# Patient Record
Sex: MALE | Race: OTHER | HISPANIC OR LATINO | URBAN - METROPOLITAN AREA
[De-identification: names, ages, dates, MRNs, and addresses within clinical notes are randomized per-mention and may not be internally consistent; named-entity substitution may affect disease eponyms.]

---

## 2020-07-02 ENCOUNTER — OFFICE VISIT (OUTPATIENT)
Dept: URBAN - METROPOLITAN AREA SURGERY CENTER 4 | Facility: SURGERY CENTER | Age: 50
End: 2020-07-02

## 2020-07-07 LAB — PATHOLOGY (INDENTED REPORT): (no result)

## 2020-07-13 ENCOUNTER — OFFICE VISIT (OUTPATIENT)
Dept: URBAN - METROPOLITAN AREA CLINIC 63 | Facility: CLINIC | Age: 50
End: 2020-07-13

## 2020-08-05 ENCOUNTER — OFFICE VISIT (OUTPATIENT)
Dept: URBAN - METROPOLITAN AREA CLINIC 63 | Facility: CLINIC | Age: 50
End: 2020-08-05

## 2020-10-01 ENCOUNTER — OFFICE VISIT (OUTPATIENT)
Dept: URBAN - METROPOLITAN AREA TELEHEALTH 2 | Facility: TELEHEALTH | Age: 50
End: 2020-10-01

## 2020-10-08 ENCOUNTER — OFFICE VISIT (OUTPATIENT)
Dept: URBAN - METROPOLITAN AREA TELEHEALTH 2 | Facility: TELEHEALTH | Age: 50
End: 2020-10-08

## 2020-12-18 ENCOUNTER — OFFICE VISIT (OUTPATIENT)
Dept: URBAN - METROPOLITAN AREA CLINIC 63 | Facility: CLINIC | Age: 50
End: 2020-12-18

## 2020-12-30 ENCOUNTER — OFFICE VISIT (OUTPATIENT)
Dept: URBAN - METROPOLITAN AREA CLINIC 63 | Facility: CLINIC | Age: 50
End: 2020-12-30

## 2021-03-26 ENCOUNTER — OFFICE VISIT (OUTPATIENT)
Dept: URBAN - METROPOLITAN AREA CLINIC 63 | Facility: CLINIC | Age: 51
End: 2021-03-26

## 2021-04-07 ENCOUNTER — OFFICE VISIT (OUTPATIENT)
Dept: URBAN - METROPOLITAN AREA CLINIC 60 | Facility: CLINIC | Age: 51
End: 2021-04-07

## 2021-04-21 ENCOUNTER — OFFICE VISIT (OUTPATIENT)
Dept: URBAN - METROPOLITAN AREA CLINIC 60 | Facility: CLINIC | Age: 51
End: 2021-04-21

## 2022-03-03 ENCOUNTER — APPOINTMENT (RX ONLY)
Dept: URBAN - METROPOLITAN AREA CLINIC 335 | Facility: CLINIC | Age: 52
Setting detail: DERMATOLOGY
End: 2022-03-03

## 2022-03-03 DIAGNOSIS — D18.0 HEMANGIOMA: ICD-10-CM

## 2022-03-03 DIAGNOSIS — L81.4 OTHER MELANIN HYPERPIGMENTATION: ICD-10-CM

## 2022-03-03 DIAGNOSIS — L24 IRRITANT CONTACT DERMATITIS: ICD-10-CM

## 2022-03-03 DIAGNOSIS — L60.8 OTHER NAIL DISORDERS: ICD-10-CM

## 2022-03-03 DIAGNOSIS — L82.1 OTHER SEBORRHEIC KERATOSIS: ICD-10-CM

## 2022-03-03 DIAGNOSIS — D22 MELANOCYTIC NEVI: ICD-10-CM

## 2022-03-03 DIAGNOSIS — B35.2 TINEA MANUUM: ICD-10-CM

## 2022-03-03 PROBLEM — D18.01 HEMANGIOMA OF SKIN AND SUBCUTANEOUS TISSUE: Status: ACTIVE | Noted: 2022-03-03

## 2022-03-03 PROBLEM — L24.9 IRRITANT CONTACT DERMATITIS, UNSPECIFIED CAUSE: Status: ACTIVE | Noted: 2022-03-03

## 2022-03-03 PROBLEM — D22.5 MELANOCYTIC NEVI OF TRUNK: Status: ACTIVE | Noted: 2022-03-03

## 2022-03-03 PROCEDURE — ? PRESCRIPTION

## 2022-03-03 PROCEDURE — ? REFERRAL

## 2022-03-03 PROCEDURE — ? COUNSELING

## 2022-03-03 PROCEDURE — 99213 OFFICE O/P EST LOW 20 MIN: CPT

## 2022-03-03 PROCEDURE — ? FULL BODY SKIN EXAM

## 2022-03-03 PROCEDURE — ? TREATMENT REGIMEN

## 2022-03-03 PROCEDURE — ? ADDITIONAL NOTES

## 2022-03-03 RX ORDER — ECONAZOLE NITRATE 10 MG/G
CREAM TOPICAL BID
Qty: 85 | Refills: 3 | Status: ERX | COMMUNITY
Start: 2022-03-03

## 2022-03-03 RX ORDER — CICLOPIROX 80 MG/ML
SOLUTION TOPICAL
Qty: 6.6 | Refills: 6 | Status: ERX | COMMUNITY
Start: 2022-03-03

## 2022-03-03 RX ADMIN — ECONAZOLE NITRATE: 10 CREAM TOPICAL at 00:00

## 2022-03-03 RX ADMIN — CICLOPIROX: 80 SOLUTION TOPICAL at 00:00

## 2022-03-03 ASSESSMENT — LOCATION ZONE DERM
LOCATION ZONE: TRUNK
LOCATION ZONE: ARM
LOCATION ZONE: HAND
LOCATION ZONE: FINGERNAIL
LOCATION ZONE: LIP
LOCATION ZONE: HAND

## 2022-03-03 ASSESSMENT — LOCATION DETAILED DESCRIPTION DERM
LOCATION DETAILED: LEFT MIDDLE FINGERNAIL
LOCATION DETAILED: RIGHT RADIAL DORSAL HAND
LOCATION DETAILED: RIGHT RADIAL DORSAL HAND
LOCATION DETAILED: RIGHT INDEX FINGERNAIL
LOCATION DETAILED: RIGHT SUPERIOR MEDIAL MIDBACK
LOCATION DETAILED: LEFT SUPERIOR VERMILION LIP
LOCATION DETAILED: LEFT MID-UPPER BACK
LOCATION DETAILED: LEFT ANTERIOR SHOULDER
LOCATION DETAILED: EPIGASTRIC SKIN
LOCATION DETAILED: LEFT RADIAL DORSAL HAND
LOCATION DETAILED: LEFT INDEX FINGERNAIL
LOCATION DETAILED: LEFT RADIAL DORSAL HAND

## 2022-03-03 ASSESSMENT — LOCATION SIMPLE DESCRIPTION DERM
LOCATION SIMPLE: RIGHT INDEX FINGER
LOCATION SIMPLE: LEFT MIDDLE FINGER
LOCATION SIMPLE: LEFT LIP
LOCATION SIMPLE: RIGHT HAND
LOCATION SIMPLE: LEFT INDEX FINGER
LOCATION SIMPLE: LEFT SHOULDER
LOCATION SIMPLE: RIGHT LOWER BACK
LOCATION SIMPLE: ABDOMEN
LOCATION SIMPLE: RIGHT HAND
LOCATION SIMPLE: LEFT HAND
LOCATION SIMPLE: LEFT UPPER BACK
LOCATION SIMPLE: LEFT HAND

## 2022-07-09 ENCOUNTER — TELEPHONE ENCOUNTER (OUTPATIENT)
Dept: URBAN - METROPOLITAN AREA CLINIC 121 | Facility: CLINIC | Age: 52
End: 2022-07-09

## 2022-07-09 RX ORDER — FAMOTIDINE 10 MG
ONCE A DAY TABLET ORAL ONCE A DAY
Refills: 3 | OUTPATIENT
Start: 2019-09-13 | End: 2019-12-04

## 2022-07-09 RX ORDER — FAMOTIDINE 10 MG
ONCE A DAY TABLET ORAL ONCE A DAY
Refills: 3 | OUTPATIENT
Start: 2019-06-13 | End: 2019-09-13

## 2022-07-09 RX ORDER — OMEG3/DHA/EPA/FISH OIL/L.CASEI 120-400MG
ONCE A DAY CAPSULE ORAL ONCE A DAY
Refills: 0 | OUTPATIENT
Start: 2019-05-15 | End: 2019-06-13

## 2022-07-09 RX ORDER — CANDESARTAN CILEXETIL 32 MG/1
TABLET ORAL
Refills: 0 | OUTPATIENT
Start: 2020-02-05 | End: 2020-04-07

## 2022-07-09 RX ORDER — LEVOTHYROXINE SODIUM 0.14 MG/1
TABLET ORAL
Refills: 0 | OUTPATIENT
Start: 2020-01-15 | End: 2020-04-07

## 2022-07-09 RX ORDER — HYDROXYZINE HYDROCHLORIDE 50 MG/1
TABLET, FILM COATED ORAL TWICE A DAY
Refills: 0 | OUTPATIENT
Start: 2020-03-17 | End: 2020-04-07

## 2022-07-09 RX ORDER — SUCRALFATE 1 G/1
TABLET ORAL
Refills: 0 | OUTPATIENT
Start: 2020-04-07 | End: 2020-04-15

## 2022-07-09 RX ORDER — OMEPRAZOLE 40 MG/1
CAPSULE, DELAYED RELEASE ORAL
Refills: 0 | OUTPATIENT
Start: 2020-04-07 | End: 2020-04-15

## 2022-07-09 RX ORDER — OLMESARTAN MEDOXOMIL 40 MG/1
TABLET, FILM COATED ORAL
Refills: 0 | OUTPATIENT
Start: 2019-03-05 | End: 2019-09-13

## 2022-07-09 RX ORDER — HYDROCORTISONE 2.5% 25 MG/G
CREAM TOPICAL TWICE A DAY
Refills: 0 | OUTPATIENT
Start: 2019-06-13 | End: 2019-09-13

## 2022-07-09 RX ORDER — AMLODIPINE BESYLATE 10 MG/1
TABLET ORAL
Refills: 0 | OUTPATIENT
Start: 2020-01-30 | End: 2020-04-07

## 2022-07-09 RX ORDER — BUSPIRONE HYDROCHLORIDE 5 MG/1
TABLET ORAL
Refills: 0 | OUTPATIENT
Start: 2020-01-30 | End: 2020-04-07

## 2022-07-09 RX ORDER — HYDROCORTISONE 2.5% 25 MG/G
CREAM TOPICAL TWICE A DAY
Refills: 0 | OUTPATIENT
Start: 2018-06-12 | End: 2019-06-13

## 2022-07-09 RX ORDER — FENOFIBRATE 145 MG/1
TABLET, FILM COATED ORAL
Refills: 0 | OUTPATIENT
Start: 2019-04-24 | End: 2019-09-13

## 2022-07-09 RX ORDER — LIDOCAINE HCL AND HYDROCORTISONE ACETATE 30; 5 MG/G; MG/G
TWICE A DAY CREAM RECTAL; TOPICAL TWICE A DAY
Refills: 3 | OUTPATIENT
Start: 2018-05-03 | End: 2018-06-12

## 2022-07-09 RX ORDER — LIDOCAINE 50 MG/G
TWICE A DAY CREAM TOPICAL TWICE A DAY
Refills: 1 | OUTPATIENT
Start: 2020-12-30 | End: 2020-12-30

## 2022-07-09 RX ORDER — AMLODIPINE BESYLATE 10 MG/1
TABLET ORAL ONCE A DAY
Refills: 0 | OUTPATIENT
Start: 2019-05-07 | End: 2019-06-13

## 2022-07-09 RX ORDER — RISPERIDONE 2 MG/1
TABLET ORAL
Refills: 0 | OUTPATIENT
Start: 2020-02-19 | End: 2020-04-07

## 2022-07-09 RX ORDER — HYDROCORTISONE 2.5% 25 MG/G
THREE TIMES A DAY CREAM TOPICAL THREE TIMES A DAY
Refills: 0 | OUTPATIENT
Start: 2018-05-24 | End: 2018-06-12

## 2022-07-09 RX ORDER — SUCRALFATE 1 G/1
TABLET ORAL
Refills: 0 | OUTPATIENT
Start: 2020-03-25 | End: 2020-04-07

## 2022-07-09 RX ORDER — OMEPRAZOLE 20 MG/1
TWICE A DAY CAPSULE, DELAYED RELEASE ORAL TWICE A DAY
Refills: 3 | OUTPATIENT
Start: 2019-06-13 | End: 2019-09-13

## 2022-07-09 RX ORDER — LIDOCAINE HCL AND HYDROCORTISONE ACETATE 30; 25 MG/G; MG/G
TWICE A DAY GEL RECTAL TWICE A DAY
Refills: 1 | OUTPATIENT
Start: 2018-05-30 | End: 2018-06-12

## 2022-07-09 RX ORDER — LIDOCAINE 50 MG/G
TWICE A DAY CREAM TOPICAL TWICE A DAY
Refills: 3 | OUTPATIENT
Start: 2019-06-13 | End: 2019-09-13

## 2022-07-09 RX ORDER — OMEPRAZOLE 40 MG/1
CAPSULE, DELAYED RELEASE ORAL
Refills: 0 | OUTPATIENT
Start: 2020-01-30 | End: 2020-04-07

## 2022-07-09 RX ORDER — SUCRALFATE 1 G/1
TWICE A DAY TABLET ORAL TWICE A DAY
Refills: 3 | OUTPATIENT
Start: 2019-06-13 | End: 2019-09-13

## 2022-07-09 RX ORDER — SERTRALINE 50 MG/1
TABLET, FILM COATED ORAL
Refills: 0 | OUTPATIENT
Start: 2020-03-17 | End: 2020-04-07

## 2022-07-09 RX ORDER — AMLODIPINE BESYLATE 10 MG/1
TABLET ORAL ONCE A DAY
Refills: 0 | OUTPATIENT
Start: 2019-06-13 | End: 2019-06-13

## 2022-07-09 RX ORDER — HYDROCORTISONE 2.5% 25 MG/G
CREAM TOPICAL
Refills: 0 | OUTPATIENT
Start: 2018-06-12 | End: 2018-06-12

## 2022-07-09 RX ORDER — LEVOTHYROXINE SODIUM 0.14 MG/1
TABLET ORAL
Refills: 0 | OUTPATIENT
Start: 2019-06-12 | End: 2019-09-13

## 2022-07-10 ENCOUNTER — TELEPHONE ENCOUNTER (OUTPATIENT)
Dept: URBAN - METROPOLITAN AREA CLINIC 121 | Facility: CLINIC | Age: 52
End: 2022-07-10

## 2022-07-10 RX ORDER — MESALAMINE 1000 MG/1
TWICE A DAY SUPPOSITORY RECTAL TWICE A DAY
Refills: 0 | Status: ACTIVE | COMMUNITY
Start: 2018-05-03

## 2022-07-10 RX ORDER — HYDROXYZINE HYDROCHLORIDE 50 MG/1
TABLET, FILM COATED ORAL TWICE A DAY
Refills: 0 | Status: ACTIVE | COMMUNITY
Start: 2020-04-07

## 2022-07-10 RX ORDER — LEVOTHYROXINE SODIUM 0.14 MG/1
TABLET ORAL
Refills: 0 | Status: ACTIVE | COMMUNITY
Start: 2019-09-13

## 2022-07-10 RX ORDER — FAMOTIDINE 20 MG/1
TABLET ORAL ONCE A DAY
Refills: 0 | Status: ACTIVE | COMMUNITY
Start: 2019-12-18

## 2022-07-10 RX ORDER — OMEPRAZOLE 40 MG/1
CAPSULE, DELAYED RELEASE ORAL
Refills: 0 | Status: ACTIVE | COMMUNITY
Start: 2020-12-17

## 2022-07-10 RX ORDER — LEVOTHYROXINE SODIUM 0.14 MG/1
TABLET ORAL
Refills: 0 | Status: ACTIVE | COMMUNITY
Start: 2020-04-07

## 2022-07-10 RX ORDER — RISPERIDONE 3 MG/1
TABLET ORAL
Refills: 0 | Status: ACTIVE | COMMUNITY
Start: 2020-12-27

## 2022-07-10 RX ORDER — CLONIDINE HYDROCHLORIDE 0.1 MG/1
TABLET ORAL
Refills: 0 | Status: ACTIVE | COMMUNITY
Start: 2020-12-28

## 2022-07-10 RX ORDER — CANDESARTAN CILEXETIL 32 MG/1
TABLET ORAL
Refills: 0 | Status: ACTIVE | COMMUNITY
Start: 2020-12-17

## 2022-07-10 RX ORDER — OMEPRAZOLE 40 MG/1
ONCE A DAY CAPSULE, DELAYED RELEASE ORAL ONCE A DAY
Refills: 0 | Status: ACTIVE | COMMUNITY
Start: 2020-04-15

## 2022-07-10 RX ORDER — OMEPRAZOLE 20 MG/1
ONCE A DAY CAPSULE, DELAYED RELEASE ORAL ONCE A DAY
Refills: 3 | Status: ACTIVE | COMMUNITY
Start: 2019-09-13

## 2022-07-10 RX ORDER — SUCRALFATE 1 G/1
TWICE A DAY TABLET ORAL TWICE A DAY
Refills: 2 | Status: ACTIVE | COMMUNITY
Start: 2020-04-15

## 2022-07-10 RX ORDER — RISPERIDONE 2 MG/1
TABLET ORAL
Refills: 0 | Status: ACTIVE | COMMUNITY
Start: 2020-04-07

## 2022-07-10 RX ORDER — OXYCODONE AND ACETAMINOPHEN 5; 325 MG/1; MG/1
TABLET ORAL
Refills: 0 | Status: ACTIVE | COMMUNITY
Start: 2020-12-17

## 2022-07-10 RX ORDER — FENOFIBRATE 145 MG/1
TABLET, FILM COATED ORAL
Refills: 0 | Status: ACTIVE | COMMUNITY
Start: 2019-09-13

## 2022-07-10 RX ORDER — CANDESARTAN CILEXETIL 32 MG/1
TABLET ORAL
Refills: 0 | Status: ACTIVE | COMMUNITY
Start: 2020-04-07

## 2022-07-10 RX ORDER — OLMESARTAN MEDOXOMIL 40 MG/1
TABLET, FILM COATED ORAL
Refills: 0 | Status: ACTIVE | COMMUNITY
Start: 2019-09-13

## 2022-07-10 RX ORDER — NITROGLYCERIN 4 MG/G
TWICE A DAY OINTMENT RECTAL TWICE A DAY
Refills: 1 | Status: ACTIVE | COMMUNITY
Start: 2018-06-12

## 2022-07-10 RX ORDER — SERTRALINE 50 MG/1
TABLET, FILM COATED ORAL
Refills: 0 | Status: ACTIVE | COMMUNITY
Start: 2020-04-07

## 2022-07-10 RX ORDER — BUSPIRONE HYDROCHLORIDE 5 MG/1
TABLET ORAL
Refills: 0 | Status: ACTIVE | COMMUNITY
Start: 2020-04-07

## 2022-07-10 RX ORDER — AMLODIPINE BESYLATE 10 MG/1
TABLET ORAL
Refills: 0 | Status: ACTIVE | COMMUNITY
Start: 2020-04-07

## 2022-07-10 RX ORDER — LEVOTHYROXINE SODIUM 0.14 MG/1
TABLET ORAL
Refills: 0 | Status: ACTIVE | COMMUNITY
Start: 2020-12-11

## 2022-11-15 ENCOUNTER — ERX REFILL RESPONSE (OUTPATIENT)
Dept: URBAN - METROPOLITAN AREA CLINIC 63 | Facility: CLINIC | Age: 52
End: 2022-11-15

## 2022-11-15 RX ORDER — RANITIDINE HYDROCHLORIDE 150 MG/1
TAKE ONE CAPSULE BY MOUTH ONCE A DAY CAPSULE ORAL
Qty: 30 CAPSULE | Refills: 0 | OUTPATIENT

## 2022-11-21 ENCOUNTER — ERX REFILL RESPONSE (OUTPATIENT)
Dept: URBAN - METROPOLITAN AREA CLINIC 63 | Facility: CLINIC | Age: 52
End: 2022-11-21

## 2022-11-21 RX ORDER — RANITIDINE HYDROCHLORIDE 150 MG/1
TAKE ONE CAPSULE BY MOUTH ONCE A DAY CAPSULE ORAL
Qty: 30 CAPSULE | Refills: 0 | OUTPATIENT

## 2023-08-15 ENCOUNTER — WEB ENCOUNTER (OUTPATIENT)
Dept: URBAN - METROPOLITAN AREA CLINIC 60 | Facility: CLINIC | Age: 53
End: 2023-08-15

## 2023-08-15 ENCOUNTER — OFFICE VISIT (OUTPATIENT)
Dept: URBAN - METROPOLITAN AREA CLINIC 60 | Facility: CLINIC | Age: 53
End: 2023-08-15

## 2023-08-15 ENCOUNTER — DASHBOARD ENCOUNTERS (OUTPATIENT)
Age: 53
End: 2023-08-15

## 2023-08-15 VITALS
RESPIRATION RATE: 20 BRPM | HEART RATE: 64 BPM | OXYGEN SATURATION: 97 % | HEIGHT: 67 IN | DIASTOLIC BLOOD PRESSURE: 78 MMHG | BODY MASS INDEX: 31.08 KG/M2 | SYSTOLIC BLOOD PRESSURE: 110 MMHG | TEMPERATURE: 97.5 F | WEIGHT: 198 LBS

## 2023-08-15 DIAGNOSIS — R10.9 ABDOMINAL PAIN, UNSPECIFIED ABDOMINAL LOCATION: ICD-10-CM

## 2023-08-15 DIAGNOSIS — K59.03 DRUG-INDUCED CONSTIPATION: ICD-10-CM

## 2023-08-15 DIAGNOSIS — K56.609 SMALL BOWEL OBSTRUCTION: ICD-10-CM

## 2023-08-15 PROCEDURE — 99214 OFFICE O/P EST MOD 30 MIN: CPT | Performed by: NURSE PRACTITIONER

## 2023-08-15 PROCEDURE — 99244 OFF/OP CNSLTJ NEW/EST MOD 40: CPT | Performed by: NURSE PRACTITIONER

## 2023-08-15 RX ORDER — OXYCODONE AND ACETAMINOPHEN 5; 325 MG/1; MG/1
TABLET ORAL
Refills: 0 | Status: ACTIVE | COMMUNITY
Start: 2020-12-17

## 2023-08-15 RX ORDER — LEVOTHYROXINE SODIUM 0.14 MG/1
TABLET ORAL
Refills: 0 | Status: ACTIVE | COMMUNITY
Start: 2019-09-13

## 2023-08-15 RX ORDER — AMLODIPINE BESYLATE 10 MG/1
TABLET ORAL
Refills: 0 | Status: ACTIVE | COMMUNITY
Start: 2020-04-07

## 2023-08-15 RX ORDER — LUBIPROSTONE 24 UG/1
1 CAPSULE WITH FOOD AND WATER CAPSULE, GELATIN COATED ORAL TWICE A DAY
Qty: 180 CAPSULE | OUTPATIENT
Start: 2023-08-15 | End: 2023-09-14

## 2023-08-15 NOTE — HPI-TODAY'S VISIT:
Patient is in good general state today, he is complaining of 3-4 diarrhea daily mostly in a.m. Patient laboratories: Stool studies are negative, IBD panel and celiac disease panel are negative, Lactoferrin is negative but calprotectin stool is elevated 173. SBFT is negative.  Patient last colonoscopy 2 years ago with evidence of polyps. We are going to repeat colonoscopy to obtain random biopsies of the colon and terminal ileum to rule out colitis/IBD. 12/20 Patient here today in good general state, complaining of constipation that alternate with diarrhea witch is chronic (diagnosed with IBS) also hemorrhoids irritation and rectal pain/spasm.  He said had an extensive workout done by his urologist and was diagnostic with elevator ani spasm syndrome.  He said has been receiving massage and others therapy including injections with steroids and lidocaine, and he did not resolve. Last colonoscopy done in July 2020 shows evidence of a 5 mm tubular adenoma polyp into the transverse colon, 5 mm hyperplastic polyp into the sigmoid colon, internal hemorrhoids, the entire examined colon and the examined portion of the ileum was normal.  Biopsy results shows evidence of: Random colon biopsy shows evidence of hyperplastic polyp, terminal ileum biopsy without specific histologic finding. Patient will resume treatment with MiraLAX daily if constipation, I will refer him to the colorectal surgeon, he will do topical treatment with Diltiazem. Also, a trial with IBgard. 4/21 Patient here today complaining of constipation that alternate with diarrhea.  He has medical history of IBS predominate constipation. Last colonoscopy was done a year ago. Denies any rectal bleeding.  Still complaining of rectal spasm with perineal pain. He visited DR Valdez on January 2021 by his OV note the rectal exam reveals pinpoint tenderness of the prostate and no tenderness when elevator muscle retracted. Patient was advised to see his urology specialist since patient also complaints of urinary and intercourse problems. A skin tags near his anus was removed sent for pathology. A CT scan of was order but patient did not complete yet.  Patient also was offered a referral to [facility] he refused.  He will have an office visit with his Urologist tomorrow.  Patient also said he did visit a pain management doctor who performed a nerve block  but the procedure did not help him either. He is receiving therapy massage for rectal pain and said this therapy has been helping, just by a few hours. Plan: Patient will do a trial with IBgard for IBS, will obtain appointment as soon as possible with Dr. Valdez for biopsies results and CT scan. 8/23 Patient here today complaining of constipation. Patient has medical history of IBS diarrhea alternating With constipation, he states that the last 10 months his constipation got worse.  He is on opioids for pain and also he smokes marijuana. Claims that he was admitted to the [facility] recently, with a diagnosis of small bowel obstruction. Which was treated conservative. Patient will start on Amitiza 24 mcg twice a day, colonoscopy.

## 2023-08-21 ENCOUNTER — TELEPHONE ENCOUNTER (OUTPATIENT)
Dept: URBAN - METROPOLITAN AREA CLINIC 63 | Facility: CLINIC | Age: 53
End: 2023-08-21

## 2023-08-22 ENCOUNTER — LAB OUTSIDE AN ENCOUNTER (OUTPATIENT)
Dept: URBAN - METROPOLITAN AREA CLINIC 60 | Facility: CLINIC | Age: 53
End: 2023-08-22

## 2023-09-20 ENCOUNTER — TELEPHONE ENCOUNTER (OUTPATIENT)
Dept: URBAN - METROPOLITAN AREA CLINIC 63 | Facility: CLINIC | Age: 53
End: 2023-09-20

## 2023-09-20 PROBLEM — 82934008: Status: ACTIVE | Noted: 2023-09-20

## 2023-09-20 RX ORDER — AMLODIPINE BESYLATE 10 MG/1
TABLET ORAL
Refills: 0 | Status: ACTIVE | COMMUNITY
Start: 2020-04-07

## 2023-09-20 RX ORDER — OXYCODONE AND ACETAMINOPHEN 5; 325 MG/1; MG/1
TABLET ORAL
Refills: 0 | Status: ACTIVE | COMMUNITY
Start: 2020-12-17

## 2023-09-20 RX ORDER — PLECANATIDE 3 MG/1
1 TABLET TABLET ORAL ONCE A DAY
Qty: 90 TABLET | OUTPATIENT
Start: 2023-09-20 | End: 2023-10-20

## 2023-09-20 RX ORDER — LEVOTHYROXINE SODIUM 0.14 MG/1
TABLET ORAL
Refills: 0 | Status: ACTIVE | COMMUNITY
Start: 2019-09-13

## 2023-09-28 ENCOUNTER — OFFICE VISIT (OUTPATIENT)
Dept: URBAN - METROPOLITAN AREA SURGERY CENTER 4 | Facility: SURGERY CENTER | Age: 53
End: 2023-09-28

## 2023-10-17 ENCOUNTER — OFFICE VISIT (OUTPATIENT)
Dept: URBAN - METROPOLITAN AREA CLINIC 60 | Facility: CLINIC | Age: 53
End: 2023-10-17

## 2023-11-21 ENCOUNTER — OFFICE VISIT (OUTPATIENT)
Dept: URBAN - METROPOLITAN AREA SURGERY CENTER 4 | Facility: SURGERY CENTER | Age: 53
End: 2023-11-21

## 2023-11-21 RX ORDER — OXYCODONE AND ACETAMINOPHEN 5; 325 MG/1; MG/1
TABLET ORAL
Refills: 0 | Status: ACTIVE | COMMUNITY
Start: 2020-12-17

## 2023-11-21 RX ORDER — LEVOTHYROXINE SODIUM 0.14 MG/1
TABLET ORAL
Refills: 0 | Status: ACTIVE | COMMUNITY
Start: 2019-09-13

## 2023-11-21 RX ORDER — AMLODIPINE BESYLATE 10 MG/1
TABLET ORAL
Refills: 0 | Status: ACTIVE | COMMUNITY
Start: 2020-04-07

## 2023-12-19 ENCOUNTER — OFFICE VISIT (OUTPATIENT)
Dept: URBAN - METROPOLITAN AREA CLINIC 60 | Facility: CLINIC | Age: 53
End: 2023-12-19

## 2024-01-16 ENCOUNTER — TELEPHONE ENCOUNTER (OUTPATIENT)
Dept: URBAN - METROPOLITAN AREA CLINIC 64 | Facility: CLINIC | Age: 54
End: 2024-01-16

## 2024-01-16 ENCOUNTER — OFFICE VISIT (OUTPATIENT)
Dept: URBAN - METROPOLITAN AREA SURGERY CENTER 4 | Facility: SURGERY CENTER | Age: 54
End: 2024-01-16

## 2024-02-13 ENCOUNTER — OV EP (OUTPATIENT)
Dept: URBAN - METROPOLITAN AREA CLINIC 60 | Facility: CLINIC | Age: 54
End: 2024-02-13

## 2024-02-27 ENCOUNTER — COLON (OUTPATIENT)
Dept: URBAN - METROPOLITAN AREA SURGERY CENTER 4 | Facility: SURGERY CENTER | Age: 54
End: 2024-02-27

## 2024-04-09 ENCOUNTER — LAB (OUTPATIENT)
Dept: URBAN - METROPOLITAN AREA CLINIC 4 | Facility: CLINIC | Age: 54
End: 2024-04-09
Payer: COMMERCIAL

## 2024-04-09 ENCOUNTER — COLON (OUTPATIENT)
Dept: URBAN - METROPOLITAN AREA SURGERY CENTER 4 | Facility: SURGERY CENTER | Age: 54
End: 2024-04-09
Payer: COMMERCIAL

## 2024-04-09 DIAGNOSIS — K63.89 OTHER SPECIFIED DISEASES OF INTESTINE: ICD-10-CM

## 2024-04-09 DIAGNOSIS — Z86.010 PERSONAL HISTORY OF COLONIC POLYPS: ICD-10-CM

## 2024-04-09 DIAGNOSIS — K62.1 RECTAL POLYP: ICD-10-CM

## 2024-04-09 DIAGNOSIS — K64.1 SECOND DEGREE HEMORRHOIDS: ICD-10-CM

## 2024-04-09 PROCEDURE — 45380 COLONOSCOPY AND BIOPSY: CPT | Performed by: INTERNAL MEDICINE

## 2024-04-09 PROCEDURE — 88305 TISSUE EXAM BY PATHOLOGIST: CPT | Performed by: PATHOLOGY

## 2024-04-09 RX ORDER — AMLODIPINE BESYLATE 10 MG/1
TABLET ORAL
Refills: 0 | Status: ACTIVE | COMMUNITY
Start: 2020-04-07

## 2024-04-09 RX ORDER — LEVOTHYROXINE SODIUM 0.14 MG/1
TABLET ORAL
Refills: 0 | Status: ACTIVE | COMMUNITY
Start: 2019-09-13

## 2024-04-09 RX ORDER — OXYCODONE AND ACETAMINOPHEN 5; 325 MG/1; MG/1
TABLET ORAL
Refills: 0 | Status: ACTIVE | COMMUNITY
Start: 2020-12-17

## 2024-04-24 ENCOUNTER — OV EP (OUTPATIENT)
Dept: URBAN - METROPOLITAN AREA CLINIC 63 | Facility: CLINIC | Age: 54
End: 2024-04-24

## 2024-05-01 ENCOUNTER — OFFICE VISIT (OUTPATIENT)
Dept: URBAN - METROPOLITAN AREA CLINIC 63 | Facility: CLINIC | Age: 54
End: 2024-05-01

## 2024-09-18 ENCOUNTER — OFFICE VISIT (OUTPATIENT)
Dept: URBAN - METROPOLITAN AREA CLINIC 63 | Facility: CLINIC | Age: 54
End: 2024-09-18